# Patient Record
Sex: MALE | Race: WHITE | NOT HISPANIC OR LATINO | URBAN - METROPOLITAN AREA
[De-identification: names, ages, dates, MRNs, and addresses within clinical notes are randomized per-mention and may not be internally consistent; named-entity substitution may affect disease eponyms.]

---

## 2019-11-08 ENCOUNTER — INPATIENT (INPATIENT)
Facility: HOSPITAL | Age: 54
LOS: 2 days | Discharge: HOME | DRG: 558 | End: 2019-11-11
Attending: HOSPITALIST | Admitting: STUDENT IN AN ORGANIZED HEALTH CARE EDUCATION/TRAINING PROGRAM
Payer: COMMERCIAL

## 2019-11-08 VITALS
SYSTOLIC BLOOD PRESSURE: 136 MMHG | HEIGHT: 68 IN | TEMPERATURE: 100 F | HEART RATE: 110 BPM | RESPIRATION RATE: 17 BRPM | OXYGEN SATURATION: 100 % | DIASTOLIC BLOOD PRESSURE: 82 MMHG | WEIGHT: 169.98 LBS

## 2019-11-08 DIAGNOSIS — L02.91 CUTANEOUS ABSCESS, UNSPECIFIED: ICD-10-CM

## 2019-11-08 LAB
ALBUMIN SERPL ELPH-MCNC: 3.8 G/DL — SIGNIFICANT CHANGE UP (ref 3.3–5)
ALP SERPL-CCNC: 84 U/L — SIGNIFICANT CHANGE UP (ref 40–120)
ALT FLD-CCNC: 30 U/L — SIGNIFICANT CHANGE UP (ref 12–78)
ANION GAP SERPL CALC-SCNC: 8 MMOL/L — SIGNIFICANT CHANGE UP (ref 5–17)
APTT BLD: 33 SEC — SIGNIFICANT CHANGE UP (ref 28.5–37)
AST SERPL-CCNC: 21 U/L — SIGNIFICANT CHANGE UP (ref 15–37)
BASOPHILS # BLD AUTO: 0.07 K/UL — SIGNIFICANT CHANGE UP (ref 0–0.2)
BASOPHILS NFR BLD AUTO: 0.8 % — SIGNIFICANT CHANGE UP (ref 0–2)
BILIRUB SERPL-MCNC: 1 MG/DL — SIGNIFICANT CHANGE UP (ref 0.2–1.2)
BUN SERPL-MCNC: 16 MG/DL — SIGNIFICANT CHANGE UP (ref 7–23)
CALCIUM SERPL-MCNC: 8.8 MG/DL — SIGNIFICANT CHANGE UP (ref 8.5–10.1)
CHLORIDE SERPL-SCNC: 107 MMOL/L — SIGNIFICANT CHANGE UP (ref 96–108)
CO2 SERPL-SCNC: 25 MMOL/L — SIGNIFICANT CHANGE UP (ref 22–31)
CREAT SERPL-MCNC: 1.5 MG/DL — HIGH (ref 0.5–1.3)
EOSINOPHIL # BLD AUTO: 0.1 K/UL — SIGNIFICANT CHANGE UP (ref 0–0.5)
EOSINOPHIL NFR BLD AUTO: 1.1 % — SIGNIFICANT CHANGE UP (ref 0–6)
GLUCOSE SERPL-MCNC: 105 MG/DL — HIGH (ref 70–99)
HCT VFR BLD CALC: 36 % — LOW (ref 39–50)
HGB BLD-MCNC: 11.3 G/DL — LOW (ref 13–17)
IMM GRANULOCYTES NFR BLD AUTO: 0.6 % — SIGNIFICANT CHANGE UP (ref 0–1.5)
INR BLD: 1.15 RATIO — SIGNIFICANT CHANGE UP (ref 0.88–1.16)
LACTATE SERPL-SCNC: 1.6 MMOL/L — SIGNIFICANT CHANGE UP (ref 0.7–2)
LYMPHOCYTES # BLD AUTO: 1.22 K/UL — SIGNIFICANT CHANGE UP (ref 1–3.3)
LYMPHOCYTES # BLD AUTO: 13.4 % — SIGNIFICANT CHANGE UP (ref 13–44)
MCHC RBC-ENTMCNC: 19.7 PG — LOW (ref 27–34)
MCHC RBC-ENTMCNC: 31.4 GM/DL — LOW (ref 32–36)
MCV RBC AUTO: 62.7 FL — LOW (ref 80–100)
MONOCYTES # BLD AUTO: 1.06 K/UL — HIGH (ref 0–0.9)
MONOCYTES NFR BLD AUTO: 11.7 % — SIGNIFICANT CHANGE UP (ref 2–14)
NEUTROPHILS # BLD AUTO: 6.58 K/UL — SIGNIFICANT CHANGE UP (ref 1.8–7.4)
NEUTROPHILS NFR BLD AUTO: 72.4 % — SIGNIFICANT CHANGE UP (ref 43–77)
NRBC # BLD: 0 /100 WBCS — SIGNIFICANT CHANGE UP (ref 0–0)
PLATELET # BLD AUTO: 195 K/UL — SIGNIFICANT CHANGE UP (ref 150–400)
POTASSIUM SERPL-MCNC: 4 MMOL/L — SIGNIFICANT CHANGE UP (ref 3.5–5.3)
POTASSIUM SERPL-SCNC: 4 MMOL/L — SIGNIFICANT CHANGE UP (ref 3.5–5.3)
PROT SERPL-MCNC: 7.2 G/DL — SIGNIFICANT CHANGE UP (ref 6–8.3)
PROTHROM AB SERPL-ACNC: 13.1 SEC — HIGH (ref 10–12.9)
RBC # BLD: 5.74 M/UL — SIGNIFICANT CHANGE UP (ref 4.2–5.8)
RBC # FLD: 16.2 % — HIGH (ref 10.3–14.5)
SODIUM SERPL-SCNC: 140 MMOL/L — SIGNIFICANT CHANGE UP (ref 135–145)
WBC # BLD: 9.08 K/UL — SIGNIFICANT CHANGE UP (ref 3.8–10.5)
WBC # FLD AUTO: 9.08 K/UL — SIGNIFICANT CHANGE UP (ref 3.8–10.5)

## 2019-11-08 PROCEDURE — 99223 1ST HOSP IP/OBS HIGH 75: CPT | Mod: GC,AI

## 2019-11-08 PROCEDURE — 99284 EMERGENCY DEPT VISIT MOD MDM: CPT

## 2019-11-08 PROCEDURE — 73080 X-RAY EXAM OF ELBOW: CPT | Mod: 26,RT

## 2019-11-08 PROCEDURE — 93010 ELECTROCARDIOGRAM REPORT: CPT

## 2019-11-08 PROCEDURE — 71046 X-RAY EXAM CHEST 2 VIEWS: CPT | Mod: 26

## 2019-11-08 RX ORDER — SODIUM CHLORIDE 9 MG/ML
1000 INJECTION INTRAMUSCULAR; INTRAVENOUS; SUBCUTANEOUS ONCE
Refills: 0 | Status: COMPLETED | OUTPATIENT
Start: 2019-11-08 | End: 2019-11-08

## 2019-11-08 RX ORDER — ACETAMINOPHEN 500 MG
650 TABLET ORAL ONCE
Refills: 0 | Status: COMPLETED | OUTPATIENT
Start: 2019-11-08 | End: 2019-11-08

## 2019-11-08 RX ORDER — PIPERACILLIN AND TAZOBACTAM 4; .5 G/20ML; G/20ML
3.38 INJECTION, POWDER, LYOPHILIZED, FOR SOLUTION INTRAVENOUS ONCE
Refills: 0 | Status: COMPLETED | OUTPATIENT
Start: 2019-11-08 | End: 2019-11-08

## 2019-11-08 RX ORDER — VANCOMYCIN HCL 1 G
1000 VIAL (EA) INTRAVENOUS ONCE
Refills: 0 | Status: COMPLETED | OUTPATIENT
Start: 2019-11-08 | End: 2019-11-08

## 2019-11-08 RX ADMIN — Medication 650 MILLIGRAM(S): at 21:28

## 2019-11-08 RX ADMIN — SODIUM CHLORIDE 1000 MILLILITER(S): 9 INJECTION INTRAMUSCULAR; INTRAVENOUS; SUBCUTANEOUS at 20:58

## 2019-11-08 RX ADMIN — SODIUM CHLORIDE 1000 MILLILITER(S): 9 INJECTION INTRAMUSCULAR; INTRAVENOUS; SUBCUTANEOUS at 22:45

## 2019-11-08 RX ADMIN — Medication 650 MILLIGRAM(S): at 20:58

## 2019-11-08 RX ADMIN — SODIUM CHLORIDE 1000 MILLILITER(S): 9 INJECTION INTRAMUSCULAR; INTRAVENOUS; SUBCUTANEOUS at 23:48

## 2019-11-08 RX ADMIN — Medication 250 MILLIGRAM(S): at 23:38

## 2019-11-08 RX ADMIN — PIPERACILLIN AND TAZOBACTAM 200 GRAM(S): 4; .5 INJECTION, POWDER, LYOPHILIZED, FOR SOLUTION INTRAVENOUS at 22:52

## 2019-11-08 RX ADMIN — SODIUM CHLORIDE 1000 MILLILITER(S): 9 INJECTION INTRAMUSCULAR; INTRAVENOUS; SUBCUTANEOUS at 22:12

## 2019-11-08 RX ADMIN — PIPERACILLIN AND TAZOBACTAM 3.38 GRAM(S): 4; .5 INJECTION, POWDER, LYOPHILIZED, FOR SOLUTION INTRAVENOUS at 23:24

## 2019-11-08 NOTE — ED PROVIDER NOTE - ATTENDING CONTRIBUTION TO CARE
I have personally performed a face to face diagnostic evaluation on this patient.  I have reviewed the PA note and agree with the history, exam, and plan of care, except as noted.  History and Exam by me shows patient with right elbow cellulitis and abscess, patient states he has a history of psoriases, had redness of right elbow, went to derm, placed on cephalosporin antibiotic, f/u with ortho and had right elbow incision and drainage, packing placed, advised to go to ER if developed fever, today patient had 101F temp, and came to ER, right elbow swelling and erythema spreading to inner proximal arm, warm to touch, f/u labs, cultures, iv fluids, iv abx, admit.

## 2019-11-08 NOTE — ED ADULT NURSE NOTE - OBJECTIVE STATEMENT
Patient came in to ED c/o redness/ pain/ swelling of right elbow and fever this afternoon noted @ 100.7F. Patient states his right elbow was drained by Ortho yesterday and today swelling, redness and pain is persistent- swelling came up to right upper arm with fever that prompted him to come to ED. Patient already on oral antibiotics.

## 2019-11-08 NOTE — ED PROVIDER NOTE - OBJECTIVE STATEMENT
55 yo M PMHx psoriasis presents to ED c/o redness/swelling to RUE x last week. Pt states that symptoms began gradually after mild psoriatic exacerbation, admits to scratching affected area R elbow. First noticed redness and swelling 1 week ago- started getting progressively worse, went to derm 4 days ago- sent to ortho for evaluation infection. Derm PA placed pt on abx for f/u yesterday. Pt evaluated by Dr. Falk ([facility]) yesterday- I&D done, drain placed and started on Bactrim. Today, pt reports worsening redness, and swelling into upper arm and fever. T max 101.7 F just prior to arrival. Pt asymptomatic at this time.

## 2019-11-08 NOTE — ED ADULT NURSE NOTE - NSIMPLEMENTINTERV_GEN_ALL_ED
Implemented All Universal Safety Interventions:  [place] to call system. Call bell, personal items and telephone within reach. Instruct patient to call for assistance. Room bathroom lighting operational. Non-slip footwear when patient is off stretcher. Physically safe environment: no spills, clutter or unnecessary equipment. Stretcher in lowest position, wheels locked, appropriate side rails in place.

## 2019-11-09 DIAGNOSIS — L03.119 CELLULITIS OF UNSPECIFIED PART OF LIMB: ICD-10-CM

## 2019-11-09 DIAGNOSIS — N17.9 ACUTE KIDNEY FAILURE, UNSPECIFIED: ICD-10-CM

## 2019-11-09 DIAGNOSIS — Z29.9 ENCOUNTER FOR PROPHYLACTIC MEASURES, UNSPECIFIED: ICD-10-CM

## 2019-11-09 LAB
ALBUMIN SERPL ELPH-MCNC: 3.5 G/DL — SIGNIFICANT CHANGE UP (ref 3.3–5)
ALP SERPL-CCNC: 77 U/L — SIGNIFICANT CHANGE UP (ref 40–120)
ALT FLD-CCNC: 29 U/L — SIGNIFICANT CHANGE UP (ref 12–78)
ANION GAP SERPL CALC-SCNC: 6 MMOL/L — SIGNIFICANT CHANGE UP (ref 5–17)
ANION GAP SERPL CALC-SCNC: 6 MMOL/L — SIGNIFICANT CHANGE UP (ref 5–17)
AST SERPL-CCNC: 19 U/L — SIGNIFICANT CHANGE UP (ref 15–37)
BASOPHILS # BLD AUTO: 0.05 K/UL — SIGNIFICANT CHANGE UP (ref 0–0.2)
BASOPHILS # BLD AUTO: 0.07 K/UL — SIGNIFICANT CHANGE UP (ref 0–0.2)
BASOPHILS NFR BLD AUTO: 0.7 % — SIGNIFICANT CHANGE UP (ref 0–2)
BASOPHILS NFR BLD AUTO: 0.9 % — SIGNIFICANT CHANGE UP (ref 0–2)
BILIRUB SERPL-MCNC: 1.2 MG/DL — SIGNIFICANT CHANGE UP (ref 0.2–1.2)
BUN SERPL-MCNC: 14 MG/DL — SIGNIFICANT CHANGE UP (ref 7–23)
BUN SERPL-MCNC: 16 MG/DL — SIGNIFICANT CHANGE UP (ref 7–23)
CALCIUM SERPL-MCNC: 7.8 MG/DL — LOW (ref 8.5–10.1)
CALCIUM SERPL-MCNC: 8.3 MG/DL — LOW (ref 8.5–10.1)
CHLORIDE SERPL-SCNC: 110 MMOL/L — HIGH (ref 96–108)
CHLORIDE SERPL-SCNC: 110 MMOL/L — HIGH (ref 96–108)
CO2 SERPL-SCNC: 24 MMOL/L — SIGNIFICANT CHANGE UP (ref 22–31)
CO2 SERPL-SCNC: 26 MMOL/L — SIGNIFICANT CHANGE UP (ref 22–31)
CREAT SERPL-MCNC: 1.2 MG/DL — SIGNIFICANT CHANGE UP (ref 0.5–1.3)
CREAT SERPL-MCNC: 1.3 MG/DL — SIGNIFICANT CHANGE UP (ref 0.5–1.3)
CRP SERPL-MCNC: 5.5 MG/DL — HIGH (ref 0–0.4)
EOSINOPHIL # BLD AUTO: 0.11 K/UL — SIGNIFICANT CHANGE UP (ref 0–0.5)
EOSINOPHIL # BLD AUTO: 0.12 K/UL — SIGNIFICANT CHANGE UP (ref 0–0.5)
EOSINOPHIL NFR BLD AUTO: 1.5 % — SIGNIFICANT CHANGE UP (ref 0–6)
EOSINOPHIL NFR BLD AUTO: 1.7 % — SIGNIFICANT CHANGE UP (ref 0–6)
GLUCOSE SERPL-MCNC: 104 MG/DL — HIGH (ref 70–99)
GLUCOSE SERPL-MCNC: 154 MG/DL — HIGH (ref 70–99)
HCT VFR BLD CALC: 34 % — LOW (ref 39–50)
HCT VFR BLD CALC: 35.2 % — LOW (ref 39–50)
HGB BLD-MCNC: 10.6 G/DL — LOW (ref 13–17)
HGB BLD-MCNC: 10.9 G/DL — LOW (ref 13–17)
IMM GRANULOCYTES NFR BLD AUTO: 0.7 % — SIGNIFICANT CHANGE UP (ref 0–1.5)
IMM GRANULOCYTES NFR BLD AUTO: 0.8 % — SIGNIFICANT CHANGE UP (ref 0–1.5)
LYMPHOCYTES # BLD AUTO: 1.16 K/UL — SIGNIFICANT CHANGE UP (ref 1–3.3)
LYMPHOCYTES # BLD AUTO: 1.29 K/UL — SIGNIFICANT CHANGE UP (ref 1–3.3)
LYMPHOCYTES # BLD AUTO: 15.4 % — SIGNIFICANT CHANGE UP (ref 13–44)
LYMPHOCYTES # BLD AUTO: 18.6 % — SIGNIFICANT CHANGE UP (ref 13–44)
MCHC RBC-ENTMCNC: 19.7 PG — LOW (ref 27–34)
MCHC RBC-ENTMCNC: 19.9 PG — LOW (ref 27–34)
MCHC RBC-ENTMCNC: 31 GM/DL — LOW (ref 32–36)
MCHC RBC-ENTMCNC: 31.2 GM/DL — LOW (ref 32–36)
MCV RBC AUTO: 63.5 FL — LOW (ref 80–100)
MCV RBC AUTO: 63.7 FL — LOW (ref 80–100)
MONOCYTES # BLD AUTO: 0.64 K/UL — SIGNIFICANT CHANGE UP (ref 0–0.9)
MONOCYTES # BLD AUTO: 0.87 K/UL — SIGNIFICANT CHANGE UP (ref 0–0.9)
MONOCYTES NFR BLD AUTO: 12.5 % — SIGNIFICANT CHANGE UP (ref 2–14)
MONOCYTES NFR BLD AUTO: 8.5 % — SIGNIFICANT CHANGE UP (ref 2–14)
MRSA PCR RESULT.: SIGNIFICANT CHANGE UP
NEUTROPHILS # BLD AUTO: 4.57 K/UL — SIGNIFICANT CHANGE UP (ref 1.8–7.4)
NEUTROPHILS # BLD AUTO: 5.5 K/UL — SIGNIFICANT CHANGE UP (ref 1.8–7.4)
NEUTROPHILS NFR BLD AUTO: 65.8 % — SIGNIFICANT CHANGE UP (ref 43–77)
NEUTROPHILS NFR BLD AUTO: 72.9 % — SIGNIFICANT CHANGE UP (ref 43–77)
NRBC # BLD: 0 /100 WBCS — SIGNIFICANT CHANGE UP (ref 0–0)
NRBC # BLD: 0 /100 WBCS — SIGNIFICANT CHANGE UP (ref 0–0)
PLATELET # BLD AUTO: 171 K/UL — SIGNIFICANT CHANGE UP (ref 150–400)
PLATELET # BLD AUTO: 191 K/UL — SIGNIFICANT CHANGE UP (ref 150–400)
POTASSIUM SERPL-MCNC: 3.7 MMOL/L — SIGNIFICANT CHANGE UP (ref 3.5–5.3)
POTASSIUM SERPL-MCNC: 4.1 MMOL/L — SIGNIFICANT CHANGE UP (ref 3.5–5.3)
POTASSIUM SERPL-SCNC: 3.7 MMOL/L — SIGNIFICANT CHANGE UP (ref 3.5–5.3)
POTASSIUM SERPL-SCNC: 4.1 MMOL/L — SIGNIFICANT CHANGE UP (ref 3.5–5.3)
PROT SERPL-MCNC: 6.8 G/DL — SIGNIFICANT CHANGE UP (ref 6–8.3)
RBC # BLD: 5.34 M/UL — SIGNIFICANT CHANGE UP (ref 4.2–5.8)
RBC # BLD: 5.54 M/UL — SIGNIFICANT CHANGE UP (ref 4.2–5.8)
RBC # FLD: 15.9 % — HIGH (ref 10.3–14.5)
RBC # FLD: 16 % — HIGH (ref 10.3–14.5)
S AUREUS DNA NOSE QL NAA+PROBE: SIGNIFICANT CHANGE UP
SODIUM SERPL-SCNC: 140 MMOL/L — SIGNIFICANT CHANGE UP (ref 135–145)
SODIUM SERPL-SCNC: 142 MMOL/L — SIGNIFICANT CHANGE UP (ref 135–145)
WBC # BLD: 6.95 K/UL — SIGNIFICANT CHANGE UP (ref 3.8–10.5)
WBC # BLD: 7.54 K/UL — SIGNIFICANT CHANGE UP (ref 3.8–10.5)
WBC # FLD AUTO: 6.95 K/UL — SIGNIFICANT CHANGE UP (ref 3.8–10.5)
WBC # FLD AUTO: 7.54 K/UL — SIGNIFICANT CHANGE UP (ref 3.8–10.5)

## 2019-11-09 PROCEDURE — 73200 CT UPPER EXTREMITY W/O DYE: CPT | Mod: 26,RT

## 2019-11-09 PROCEDURE — 99233 SBSQ HOSP IP/OBS HIGH 50: CPT

## 2019-11-09 RX ORDER — ACETAMINOPHEN 500 MG
650 TABLET ORAL EVERY 6 HOURS
Refills: 0 | Status: DISCONTINUED | OUTPATIENT
Start: 2019-11-09 | End: 2019-11-11

## 2019-11-09 RX ORDER — VANCOMYCIN HCL 1 G
1000 VIAL (EA) INTRAVENOUS EVERY 12 HOURS
Refills: 0 | Status: DISCONTINUED | OUTPATIENT
Start: 2019-11-09 | End: 2019-11-10

## 2019-11-09 RX ORDER — SODIUM CHLORIDE 9 MG/ML
1000 INJECTION INTRAMUSCULAR; INTRAVENOUS; SUBCUTANEOUS
Refills: 0 | Status: DISCONTINUED | OUTPATIENT
Start: 2019-11-09 | End: 2019-11-10

## 2019-11-09 RX ORDER — INFLUENZA VIRUS VACCINE 15; 15; 15; 15 UG/.5ML; UG/.5ML; UG/.5ML; UG/.5ML
0.5 SUSPENSION INTRAMUSCULAR ONCE
Refills: 0 | Status: DISCONTINUED | OUTPATIENT
Start: 2019-11-09 | End: 2019-11-11

## 2019-11-09 RX ORDER — PIPERACILLIN AND TAZOBACTAM 4; .5 G/20ML; G/20ML
3.38 INJECTION, POWDER, LYOPHILIZED, FOR SOLUTION INTRAVENOUS EVERY 8 HOURS
Refills: 0 | Status: DISCONTINUED | OUTPATIENT
Start: 2019-11-09 | End: 2019-11-11

## 2019-11-09 RX ADMIN — PIPERACILLIN AND TAZOBACTAM 25 GRAM(S): 4; .5 INJECTION, POWDER, LYOPHILIZED, FOR SOLUTION INTRAVENOUS at 06:21

## 2019-11-09 RX ADMIN — Medication 650 MILLIGRAM(S): at 22:35

## 2019-11-09 RX ADMIN — Medication 250 MILLIGRAM(S): at 12:29

## 2019-11-09 RX ADMIN — Medication 650 MILLIGRAM(S): at 13:05

## 2019-11-09 RX ADMIN — Medication 650 MILLIGRAM(S): at 14:05

## 2019-11-09 RX ADMIN — Medication 250 MILLIGRAM(S): at 21:34

## 2019-11-09 RX ADMIN — PIPERACILLIN AND TAZOBACTAM 25 GRAM(S): 4; .5 INJECTION, POWDER, LYOPHILIZED, FOR SOLUTION INTRAVENOUS at 15:15

## 2019-11-09 RX ADMIN — Medication 650 MILLIGRAM(S): at 23:35

## 2019-11-09 RX ADMIN — PIPERACILLIN AND TAZOBACTAM 25 GRAM(S): 4; .5 INJECTION, POWDER, LYOPHILIZED, FOR SOLUTION INTRAVENOUS at 22:36

## 2019-11-09 RX ADMIN — SODIUM CHLORIDE 75 MILLILITER(S): 9 INJECTION INTRAMUSCULAR; INTRAVENOUS; SUBCUTANEOUS at 03:47

## 2019-11-09 NOTE — CONSULT NOTE ADULT - ASSESSMENT
TALIA, improved    Anemia with low MCV and high RBC#-- ?thalassemia > hereditary spherocytosis    Psoriasis- brandon wright Olecranon bursitis in the setting of psoriasis, secondary cellulitis  No immune suppressants  Almost certainly S. aureus, probably not MRSA given initial response to cefadroxil (unless polymicrobial which would be unusual).  Better with antibiotics and I&D  TALIA, improved  Anemia with low MCV and high RBC#-- patient confirms history of thalassemia trait    Suggestions--  Will attempt to obtain cx data from his orthopedist  Continue antibiotics  For now, target vanco trough 15-20  Hope to narrow Rx based on cx  Elevation  Serial exam    Thank you for the courtesy of this referral.  D/W patient and his wife    Lazarus Hopkins MD  [phone]

## 2019-11-09 NOTE — H&P ADULT - PROBLEM SELECTOR PLAN 1
r/o necrotizing fascitis given rapid progression of erythema and swelling  area marked in ED, monitor for signs of progression   will cover broadly with vancomycin and zosyn   f/u MRSA nares  f/u blood cultures  f/u CT RUE  Ortho paged, f/u recommendations   ID Dr. Hopkins consulted, recommendations appreciated r/o necrotizing fascitis given rapid progression of erythema and swelling  area marked in ED, monitor for signs of progression   will cover broadly with vancomycin and zosyn   f/u MRSA nares  f/u blood cultures  f/u CT RUE- unable to preform with contrast at this time as patient has TALIA and contrast injector not currently working.   Ortho paged, f/u recommendations   ID Dr. Hopkins consulted, recommendations appreciated r/o necrotizing fascitis , initial x ray with no air noted, given rapid progression of erythema and swelling obtain ct. r/o deep abscess  area marked in ED, monitor for signs of progression   failure of outpatient antibiotics   will cover broadly with vancomycin and zosyn   f/u MRSA nares  f/u blood cultures  f/u CT RUE- unable to preform with contrast at this time as patient has TALIA and contrast injector not currently working.   Ortho paged, f/u recommendations   ID Dr. Hopkins consulted, recommendations appreciated r/o necrotizing fascitis , initial x ray with no air noted, given rapid progression of erythema and swelling obtain ct. r/o deep abscess  pt non toxic appearing  area marked in ED, monitor for signs of progression   failure of outpatient antibiotics   will cover broadly with vancomycin and zosyn   f/u MRSA nares  f/u blood cultures  f/u CT RUE- unable to preform with contrast at this time as patient has TALIA and contrast injector not currently working.   Ortho paged, awaiting call back, f/u recommendations   ID Dr. Hopkins consulted, recommendations appreciated

## 2019-11-09 NOTE — H&P ADULT - PROBLEM SELECTOR PLAN 3
no chemical prophylaxis at this time pending ortho recs   scds  IMPROVE VTE Individual Risk Assessment          RISK                                                          Points    [  ] Previous VTE                                                3  [  ] Thrombophilia                                             2  [  ] Lower limb paralysis                                   2        (unable to hold up >15 seconds)    [  ] Current Cancer                                            2         (within 6 months)  [  ] Immobilization > 24 hrs                              1  [  ] ICU/CCU stay > 24 hours                            1  [  ] Age > 60                                                    1    IMPROVE VTE Score _____0____

## 2019-11-09 NOTE — CONSULT NOTE ADULT - SUBJECTIVE AND OBJECTIVE BOX
54yMale c/o R arm redness and fever after banging his elbow several days ago. Patient then presented to  ED and had his left elbow bursa I&D'd by Dr. Falk with a drain placed. He states his arm redness spread and presented to [facility] ED last night. Patient denies numbness or tingling in the RUE. Patient denies any other injuries.    PMH:  Psoriasis  No pertinent past medical history    PSH: Denies    AH: NKDA    Meds: See med rec    T(C): 37.3 (11-09-19 @ 04:15)  HR: 82 (11-09-19 @ 04:15)  BP: 125/74 (11-09-19 @ 04:15)  RR: 17 (11-09-19 @ 04:15)  SpO2: 97% (11-09-19 @ 04:15)  Wt(kg): --    PE RUE:  Skin intact, olecranon bursa packing in place, no drainage, no palpable fluid collection, erythema demarcated from wrist to proximal arm, SILT C5-T1, +AIN/PIN/Ulnar/Radial/Musc/Median, +shoulder/elbow/wrist ROM intact, +radial pulse, soft compartments, no calf ttp.    Secondary:  No TTP over bony landmarks, SILT BL, ROM intact BL, distal pulses palpable.    Imaging:  CT demonstrating no evidence of fluid collection, effusion, or subcutaneous emphysema    .hblock

## 2019-11-09 NOTE — H&P ADULT - HISTORY OF PRESENT ILLNESS
54 y male PMHx psoriasis presents with right upper extremity redness and swelling. Patient states that 14 years ago he fell and injured his right elbow, he developed bursitis a year later and had his elbow drained. He stated that he was fine up until 1 week ago when he banged his elbow injuring a psoriatic lesion that was present there. He then noticed some redness and swelling in his elbow which slowly worsened. He went to his dermatologist 4 days ago and was prescribed cefodroxil and sent to ortho. He was evaluated by Dr. Falk (ortho) yesterday and had an I&D done and a drain placed in his arm at the elbow. He was sent home on bactrim. Today the patient noted increase in redness and swelling traveling up his arm and down to his fingers. This was a fast progression of where his baseline erythema was present.  He also noted a t max of 101.7. Patient admitted to chills earlier this evening. Denies severe pain in RUE. Denies nausea, vomiting, abdominal pain.     In the ED  VS: T 99.7, , /82, RR 17, SpO2 100 on RA 54 y male PMHx psoriasis presents with right upper extremity redness and swelling. Patient states that 14 years ago he fell and injured his right elbow, he developed bursitis a year later and had his elbow drained. He stated that he was fine up until 1 week ago when he banged his elbow injuring a psoriatic lesion that was present there. He then noticed some redness and swelling in his elbow which slowly worsened. He went to his dermatologist 4 days ago and was prescribed cefodroxil and sent to ortho. He was evaluated by Dr. Falk (ortho) yesterday and had an I&D done and a drain placed in his arm at the elbow. He was sent home on bactrim. Today the patient noted increase in redness and swelling traveling up his arm and down to his fingers. This was a fast progression of where his baseline erythema was present.  He also noted a t max of 101.7. Patient admitted to chills earlier this evening. Denies severe pain in RUE. Denies nausea, vomiting, abdominal pain.     In the ED  VS: T 99.7, , /82, RR 17, SpO2 100 on RA  Labs: Cr. 1.50, H/H 11.3/36.0  x ray right upper extremity: No radiographic evidence of acute osteomyelitis. Soft tissue swelling   with drain, bandage or packing material over the olecranon.

## 2019-11-09 NOTE — PROGRESS NOTE ADULT - PROBLEM SELECTOR PLAN 1
r/o necrotizing fascitis , initial x ray with no air noted, given rapid progression of erythema and swelling obtain ct. r/o deep abscess  pt non toxic appearing  area marked in ED, monitor for signs of progression   failure of outpatient antibiotics   will cover broadly with vancomycin and zosyn   f/u MRSA nares  f/u blood cultures  f/u CT RUE- unable to preform with contrast at this time as patient has TALIA and contrast injector not currently working.   Ortho paged, awaiting call back, f/u recommendations   ID Dr. Hopkins consulted, recommendations appreciated  F/u on wound culture sent from his Dr Falk office.   keep Vancomycin through between 15-20 per ID recs.

## 2019-11-09 NOTE — CONSULT NOTE ADULT - ASSESSMENT
54M w/ RUE cellulitis s/p I&D olecranon bursa  Packing was dc'd from the bursa  Soft dressing applied, which may be removed in 2-3 days  Abx per ID/medicine  DVT ppx  WBAT RUE  PT/OT  Incentive spirometry  No further orthopedic intervention  Recommend f/u with Dr. Falk 5-7 days from discharge of hospital  Orthopedic stable for DC

## 2019-11-09 NOTE — H&P ADULT - PROBLEM SELECTOR PLAN 2
no chemical prophylaxis at this time pending ortho recs   scds  IMPROVE VTE Individual Risk Assessment          RISK                                                          Points    [  ] Previous VTE                                                3  [  ] Thrombophilia                                             2  [  ] Lower limb paralysis                                   2        (unable to hold up >15 seconds)    [  ] Current Cancer                                            2         (within 6 months)  [  ] Immobilization > 24 hrs                              1  [  ] ICU/CCU stay > 24 hours                            1  [  ] Age > 60                                                    1    IMPROVE VTE Score _____0____ Cr 1.50 on admission, unknown baseline  maintenance IVF   monitor AM BMP

## 2019-11-09 NOTE — H&P ADULT - ASSESSMENT
54 y male PMHx psoriasis presents with right upper extremity redness and swelling. Admitted with RUE cellulitis.

## 2019-11-09 NOTE — H&P ADULT - NSHPPHYSICALEXAM_GEN_ALL_CORE
T(C): 36.9 (11-08-19 @ 22:56), Max: 37.6 (11-08-19 @ 20:03)  HR: 88 (11-08-19 @ 22:56) (88 - 110)  BP: 119/73 (11-08-19 @ 22:56) (119/73 - 136/82)  RR: 16 (11-08-19 @ 22:56) (16 - 17)  SpO2: 98% (11-08-19 @ 22:56) (98% - 100%)    GENERAL: patient appears well, no acute distress, appropriate, pleasant  EYES: sclera clear, no exudates  ENMT: oropharynx clear without erythema, no exudates, moist mucous membranes  NECK: supple, soft, no thyromegaly noted  LUNGS: good air entry bilaterally, clear to auscultation, symmetric breath sounds, no wheezing or rhonchi appreciated  HEART: soft S1/S2, regular rate and rhythm, no murmurs noted, no lower extremity edema  GASTROINTESTINAL: abdomen is soft, nontender, nondistended, normoactive bowel sounds, no palpable masses  INTEGUMENT: erythema noted from fingers up to axillae on RUE, drain in right elbow, no drainage noted. + edema noted from fingers to axillae.   MUSCULOSKELETAL: + edema noted from fingers to axillae of RUE.   NEUROLOGIC: awake, alert, oriented x3, good muscle tone in 4 extremities, no obvious sensory deficits  PSYCHIATRIC: mood is good, affect is congruent, linear and logical thought process T(C): 36.9 (11-08-19 @ 22:56), Max: 37.6 (11-08-19 @ 20:03)  HR: 88 (11-08-19 @ 22:56) (88 - 110)  BP: 119/73 (11-08-19 @ 22:56) (119/73 - 136/82)  RR: 16 (11-08-19 @ 22:56) (16 - 17)  SpO2: 98% (11-08-19 @ 22:56) (98% - 100%)    GENERAL: patient appears well, no acute distress, appropriate, pleasant  EYES: sclera clear, no exudates  ENMT: oropharynx clear without erythema, no exudates, moist mucous membranes  NECK: supple, soft, no thyromegaly noted  LUNGS: good air entry bilaterally, clear to auscultation, symmetric breath sounds, no wheezing or rhonchi appreciated  HEART: soft S1/S2, regular rate and rhythm, no murmurs noted, no lower extremity edema  GASTROINTESTINAL: abdomen is soft, nontender, nondistended, normoactive bowel sounds, no palpable masses  INTEGUMENT: erythema noted from fingers up to axillae on RUE, packing in right elbow, no drainage noted. + extensive edema noted from fingers to axillae. fingers with good cap refill, good radial pulses.   MUSCULOSKELETAL: + edema noted from fingers to axillae of RUE.   NEUROLOGIC: awake, alert, oriented x3, good muscle tone in 4 extremities, no obvious sensory deficits  PSYCHIATRIC: mood is good, affect is congruent, linear and logical thought process

## 2019-11-09 NOTE — PROGRESS NOTE ADULT - SUBJECTIVE AND OBJECTIVE BOX
Patient is a 54y old  Male who presents with a chief complaint of cellulitis (09 Nov 2019 13:52)      INTERVAL HPI/OVERNIGHT EVENTS: 54 y male PMHx psoriasis presents with right upper extremity redness and swelling. Admitted with RUE cellulitis. pt feels better, no fever no wound drainage.     MEDICATIONS  (STANDING):  influenza   Vaccine 0.5 milliLiter(s) IntraMuscular once  piperacillin/tazobactam IVPB.. 3.375 Gram(s) IV Intermittent every 8 hours  sodium chloride 0.9%. 1000 milliLiter(s) (75 mL/Hr) IV Continuous <Continuous>  vancomycin  IVPB 1000 milliGRAM(s) IV Intermittent every 12 hours    MEDICATIONS  (PRN):  acetaminophen   Tablet .. 650 milliGRAM(s) Oral every 6 hours PRN Mild Pain (1 - 3)      Allergies    No Known Allergies    Intolerances        REVIEW OF SYSTEMS:  CONSTITUTIONAL: No fever, weight loss, or fatigue  EYES: No eye pain, visual disturbances, or discharge  ENMT:  No difficulty hearing, tinnitus, vertigo; No sinus or throat pain  NECK: No pain or stiffness  BREASTS: No pain, masses, or nipple discharge  RESPIRATORY: No cough, wheezing, chills or hemoptysis; No shortness of breath  CARDIOVASCULAR: No chest pain, palpitations, dizziness, or leg swelling  GASTROINTESTINAL: No abdominal or epigastric pain. No nausea, vomiting, or hematemesis; No diarrhea or constipation. No melena or hematochezia.  GENITOURINARY: No dysuria, frequency, hematuria, or incontinence  NEUROLOGICAL: No headaches, memory loss, loss of strength, numbness, or tremors  SKIN: No itching, burning, rashes, or lesions   LYMPH NODES: No enlarged glands  ENDOCRINE: No heat or cold intolerance; No hair loss; No polydipsia or polyuria  MUSCULOSKELETAL: No joint pain or swelling; No muscle, back, or extremity pain  PSYCHIATRIC: No depression, anxiety, mood swings, or difficulty sleeping  HEME/LYMPH: No easy bruising, or bleeding gums  ALLERGY AND IMMUNOLOGIC: No hives or eczema    Vital Signs Last 24 Hrs  T(C): 37.2 (09 Nov 2019 13:24), Max: 37.6 (08 Nov 2019 20:03)  T(F): 99 (09 Nov 2019 13:24), Max: 99.7 (08 Nov 2019 20:03)  HR: 86 (09 Nov 2019 13:24) (82 - 110)  BP: 115/66 (09 Nov 2019 13:24) (115/66 - 136/82)  BP(mean): --  RR: 17 (09 Nov 2019 13:24) (16 - 17)  SpO2: 97% (09 Nov 2019 13:24) (96% - 100%)    PHYSICAL EXAM:  GENERAL: NAD, well-groomed, well-developed  HEAD:  Atraumatic, Normocephalic  EYES: EOMI, PERRLA, conjunctiva and sclera clear  ENMT: No tonsillar erythema, exudates, or enlargement; Moist mucous membranes, Good dentition, No lesions  NECK: Supple, No JVD, Normal thyroid  NERVOUS SYSTEM:  Alert & Oriented X3, Good concentration; Motor Strength 5/5 B/L upper and lower extremities; DTRs 2+ intact and symmetric  CHEST/LUNG: Clear to auscultation bilaterally; No rales, rhonchi, wheezing, or rubs  HEART: Regular rate and rhythm; No murmurs, rubs, or gallops  ABDOMEN: Soft, Nontender, Nondistended; Bowel sounds present  EXTREMITIES:  2+ Peripheral Pulses, No clubbing,  right arm edema.   LYMPH: No lymphadenopathy noted  SKIN: No rashes or lesions    LABS:                        10.9   7.54  )-----------( 191      ( 09 Nov 2019 09:01 )             35.2     09 Nov 2019 09:01    140    |  110    |  14     ----------------------------<  154    3.7     |  24     |  1.30     Ca    8.3        09 Nov 2019 09:01    TPro  6.8    /  Alb  3.5    /  TBili  1.2    /  DBili  x      /  AST  19     /  ALT  29     /  AlkPhos  77     09 Nov 2019 09:01    PT/INR - ( 08 Nov 2019 20:59 )   PT: 13.1 sec;   INR: 1.15 ratio         PTT - ( 08 Nov 2019 20:59 )  PTT:33.0 sec    CAPILLARY BLOOD GLUCOSE          RADIOLOGY & ADDITIONAL TESTS:    Imaging Personally Reviewed:  [x ] YES  [ ] NO    Consultant(s) Notes Reviewed:  [x ] YES  [ ] NO    Care Discussed with Consultants/Other Providers [x ] YES  [ ] NO

## 2019-11-09 NOTE — CONSULT NOTE ADULT - SUBJECTIVE AND OBJECTIVE BOX
[facility], Division of Infectious Diseases  JANE Melton A. Lee    SHAYAN, PETER  54y, Male  [id number]    HPI--  54M with psoriasis developed infection on elbow not repsonsive to PO antibiotics prescribed by his dermatologist, sent to orthopedic surgeon who performed an I&D of what sounds like olecranon bursitis. However patient developed increasing redness and fever depsite PO Bactrim and presented here for further treatment. Patient has been seen by orthopedics, wound unpacked by them. X-rays unremarkable for bony pathology and no surgical intervention indicated at this time. Patient had a CT of the arm, without contrast. There was no air or changes concerning for fasciitis, nor abscess (though study was noncontrast.     PMH/PSH--  Psoriasis  No pertinent past medical history      Allergies--NKDA      Medications--  Antibiotics: piperacillin/tazobactam IVPB.. 3.375 Gram(s) IV Intermittent every 8 hours  vancomycin  IVPB 1000 milliGRAM(s) IV Intermittent every 12 hours    Immunologic: influenza   Vaccine 0.5 milliLiter(s) IntraMuscular once    Other: acetaminophen   Tablet .. PRN  sodium chloride 0.9%.      Social History--  EtOH: denies   Tobacco: denies   Drug Use: denies     Family/Marital History--    Remainder not relevant to clinical concern.    Travel/Environmental/Occupational History:  *** insert T/E/O Hx ***    Review of Systems:  A >=10-point review of systems was obtained.     Pertinent positives and negatives--  Constitutional: No fevers. No Chills. No Rigors.   Eyes:  ENMT:  Cardiovascular: No chest pain. No palpitations.  Respiratory: No shortness of breath. No cough.  Gastrointestinal: No nausea or vomiting. No diarrhea or constipation.   Genitourinary:  Musculoskeletal:  Skin:  Neurologic:  Psychiatric: Pleasant. Appropriate affect.  Endocrine:  Heme/Lymphatic:  Allergy/Immunologic:    Review of systems otherwise negative except as previously noted.    Physical Exam--  Vital Signs: T(F): 99 (11-09-19 @ 13:24), Max: 99.7 (11-08-19 @ 20:03)  HR: 86 (11-09-19 @ 13:24)  BP: 115/66 (11-09-19 @ 13:24)  RR: 17 (11-09-19 @ 13:24)  SpO2: 97% (11-09-19 @ 13:24)  Wt(kg): --  General: Nontoxic-appearing Male in no acute distress.  HEENT: AT/NC. PERRL. EOMI. Anicteric. Conjunctiva pink and moist. Oropharynx clear. Dentition fair.  Neck: Not rigid. No sense of mass.  Nodes: None palpable.  Lungs: Clear bilaterally without rales, wheezing or rhonchi  Heart: Regular rate and rhythm. No Murmur. No rub. No gallop. No palpable thrill.  Abdomen: Bowel sounds present and normoactive. Soft. Nondistended. Nontender. No sense of mass. No organomegaly.  Back: No spinal tenderness. No costovertebral angle tenderness.   Extremities: No cyanosis or clubbing. No edema.   Skin: Warm. Dry. Good turgor. No rash. No vasculitic stigmata.  Psychiatric: Appropriate affect and mood for situation.         Laboratory & Imaging Data--  CBC                        10.9   7.54  )-----------( 191      ( 09 Nov 2019 09:01 )             35.2       Chemistries  11-09    140  |  110<H>  |  14  ----------------------------<  154<H>  3.7   |  24  |  1.30    Ca    8.3<L>      09 Nov 2019 09:01    TPro  6.8  /  Alb  3.5  /  TBili  1.2  /  DBili  x   /  AST  19  /  ALT  29  /  AlkPhos  77  11-09      Culture Data    < from: Xray Elbow AP + Lateral + Oblique, Right (11.08.19 @ 20:53) >  EXAM:  ELBOW RIGHT (3 VIEWS)                        PROCEDURE DATE:  11/08/2019    INTERPRETATION:  CLINICAL HISTORY: 54 years  Male with abscess.  COMPARISON: None  AP, lateral and oblique radiographs of the right elbow were obtained.    There is packing material versus bandage or drain in the soft tissues   overlying the olecranon with soft tissue thickening. No soft tissue air   is identified.    There is no fracture, subluxation or dislocation. No joint effusion is   present.No lytic or blastic lesions are identified. There is a small   olecranon spur.    There is no periosteal reaction or cortical disruption to suggest   osteomyelitis.    The osseous mineralization is normal.    No soft tissue abnormality is seen.    IMPRESSION:  No radiographic evidence of acute osteomyelitis. Soft tissue swelling   with drain, bandage or packing material over the olecranon.    TENISHA CRISTOBAL M.D., ATTENDING RADIOLOGIST  This document has been electronically signed. Nov 8 2019  8:59PM  < end of copied text >      < from: CT Upper Extremity No Cont, Right (11.09.19 @ 02:40) >  INTERPRETATION:      PROCEDURE INFORMATION:   Exam: CT Right Upper Extremity Without Contrast   Exam date and time: 11/9/2019 1:39 AM   Clinical history: 54 years old, male; Condition or disease; Upper limb;   Right;   Patient HX: Rue swelling, erythema R/O nec fac, deep abscess     TECHNIQUE:   Imaging protocol: CT of the Right upper extremity without intravenous   contrast was performed.     COMPARISON:   DX XR ELBOW 3 VIEWS RIGHT 11/8/2019 8:50 PM     FINDINGS:   Bones/joints: Laceration involving the proximal forearm/elbow dorsally   with packing material in place. Osseous structures intact. No joint   effusion.   Soft tissues: Moderate subcutaneous edematous  and skin thickening noted   greatest involving the right forearm. No abscess collection. No   subcutaneous air. However, the lack of intravenous contrast material   limits assessment for loculated fluid collections.  IMPRESSION:   1. Right forearm cellulitis with laceration with packing material along   the dorsal aspect of proximal forearm/elbow. No abscess collection. No   subcutaneous air to suggest necrotizing fasciitis.   2. No joint effusion. Osseous structures intact.  3. The lackof intravenous contrast material limits assessment for   loculated fluid collections.  A preliminary report was given by [facility] RADIOLOGY: BRIAN BECK M.D  < end of copied text > [facility], Division of Infectious Diseases  JANE Melton A. Lee    SHAYAN, PETER  54y, Male  [id number]    HPI--  54M with psoriasis diagnosed about 6 months ago, on treatment for about 4 weeks with topical steroids, developed infection on elbow not repsonsive to PO antibiotics, He initially was given cefadroxil with improvement, but progress plateaued and he was sent to Dr. Hill, an orthopedic surgeon, who performed an I&D of what sounds like olecranon bursitis. Cultures were sent at the time of the I&D, results not known. However patient developed increasing redness and fever despite PO Bactrim and presented here for further treatment. Patient has been seen by orthopedics, wound unpacked by them. X-rays unremarkable for bony pathology and no surgical intervention indicated at this time. Patient had a CT of the arm, without contrast. There was no air or changes concerning for fasciitis, nor abscess (though study was noncontrast.     Patient had trauma about 14 years ago to the same elbow and developed olecranon bursitis there abotu 6 months afterward. Patient has not had any problems since with that elbow. Denies any history of osteomyelitis, patient was treated with only a brief course of oral antibiotics at this time (though did require I&D then, too).     Does not recall any recent trauma to the elbow. No other similar episodes. He has only received topical Rx for the posirasis, no systemic steroids or immunosuppressive agents.    He states that although edema had increased, today is better than yesterday. The pain and redness are also less.      PMH/PSH--  Psoriasis  No pertinent past medical history      Allergies--NKDA      Medications--  Antibiotics: piperacillin/tazobactam IVPB.. 3.375 Gram(s) IV Intermittent every 8 hours  vancomycin  IVPB 1000 milliGRAM(s) IV Intermittent every 12 hours    Immunologic: influenza   Vaccine 0.5 milliLiter(s) IntraMuscular once    Other: acetaminophen   Tablet .. PRN  sodium chloride 0.9%.      Social History--  EtOH: denies   Tobacco: denies   Drug Use: denies     Family/Marital History--  .  Remainder not relevant to clinical concern.    Travel/Environmental/Occupational History:  , currently working on [employer]    Review of Systems:  A >=10-point review of systems was obtained.   Review of systems otherwise negative except as previously noted.    Physical Exam--  Vital Signs: T(F): 99 (11-09-19 @ 13:24), Max: 99.7 (11-08-19 @ 20:03)  HR: 86 (11-09-19 @ 13:24)  BP: 115/66 (11-09-19 @ 13:24)  RR: 17 (11-09-19 @ 13:24)  SpO2: 97% (11-09-19 @ 13:24)  Wt(kg): --  General: Nontoxic-appearing Male in no acute distress.  HEENT: AT/NC. Anicteric. Conjunctiva pink and moist. Oropharynx clear.   Neck: Not rigid. No sense of mass.  Nodes: None palpable.  Lungs: Clear bilaterally without rales, wheezing or rhonchi  Heart: Regular rate and rhythm. No Murmur. No rub. No gallop. No palpable thrill.  Abdomen: Bowel sounds present and normoactive. Soft. Nondistended. Nontender. No sense of mass. No organomegaly.  Back: No spinal tenderness. No costovertebral angle tenderness.   Extremities: No cyanosis or clubbing. I&D site clean/dry. Not packed. No DC expressible. Mild calor/erythema. 2+ edema mid upper arm to fingers. No bullae,anesthesia or concern of deep infection. ROM elbow full.   Skin: Warm. Dry. Good turgor. No rash. No vasculitic stigmata. Psoriaform plaques on L elbow and L prepatellar area as well  Psychiatric: Appropriate affect and mood for situation.       Laboratory & Imaging Data--  CBC                        10.9   7.54  )-----------( 191      ( 09 Nov 2019 09:01 )             35.2       Chemistries  11-09    140  |  110<H>  |  14  ----------------------------<  154<H>  3.7   |  24  |  1.30    Ca    8.3<L>      09 Nov 2019 09:01    TPro  6.8  /  Alb  3.5  /  TBili  1.2  /  DBili  x   /  AST  19  /  ALT  29  /  AlkPhos  77  11-09      Culture Data  None    < from: Xray Elbow AP + Lateral + Oblique, Right (11.08.19 @ 20:53) >  EXAM:  ELBOW RIGHT (3 VIEWS)                        PROCEDURE DATE:  11/08/2019    INTERPRETATION:  CLINICAL HISTORY: 54 years  Male with abscess.  COMPARISON: None  AP, lateral and oblique radiographs of the right elbow were obtained.    There is packing material versus bandage or drain in the soft tissues   overlying the olecranon with soft tissue thickening. No soft tissue air   is identified.    There is no fracture, subluxation or dislocation. No joint effusion is   present.No lytic or blastic lesions are identified. There is a small   olecranon spur.    There is no periosteal reaction or cortical disruption to suggest   osteomyelitis.    The osseous mineralization is normal.    No soft tissue abnormality is seen.    IMPRESSION:  No radiographic evidence of acute osteomyelitis. Soft tissue swelling   with drain, bandage or packing material over the olecranon.    TENISHA CRISTOBAL M.D., ATTENDING RADIOLOGIST  This document has been electronically signed. Nov 8 2019  8:59PM  < end of copied text >      < from: CT Upper Extremity No Cont, Right (11.09.19 @ 02:40) >  INTERPRETATION:      PROCEDURE INFORMATION:   Exam: CT Right Upper Extremity Without Contrast   Exam date and time: 11/9/2019 1:39 AM   Clinical history: 54 years old, male; Condition or disease; Upper limb;   Right;   Patient HX: Rue swelling, erythema R/O nec fac, deep abscess     TECHNIQUE:   Imaging protocol: CT of the Right upper extremity without intravenous   contrast was performed.     COMPARISON:   DX XR ELBOW 3 VIEWS RIGHT 11/8/2019 8:50 PM     FINDINGS:   Bones/joints: Laceration involving the proximal forearm/elbow dorsally   with packing material in place. Osseous structures intact. No joint   effusion.   Soft tissues: Moderate subcutaneous edematous  and skin thickening noted   greatest involving the right forearm. No abscess collection. No   subcutaneous air. However, the lack of intravenous contrast material   limits assessment for loculated fluid collections.  IMPRESSION:   1. Right forearm cellulitis with laceration with packing material along   the dorsal aspect of proximal forearm/elbow. No abscess collection. No   subcutaneous air to suggest necrotizing fasciitis.   2. No joint effusion. Osseous structures intact.  3. The lackof intravenous contrast material limits assessment for   loculated fluid collections.  A preliminary report was given by [facility] RADIOLOGY: BRIAN BECK M.D  < end of copied text >

## 2019-11-09 NOTE — H&P ADULT - NSHPREVIEWOFSYSTEMS_GEN_ALL_CORE
CONSTITUTIONAL: admits to fevers, chills. Denies weakness   HEENT: denies blurred vision, sore throat  SKIN: swelling and erythema or RUE, drain in right elbow   CARDIOVASCULAR: denies chest pain, chest pressure, palpitations  RESPIRATORY: denies shortness of breath, sputum production  GASTROINTESTINAL: denies nausea, vomiting, diarrhea, abdominal pain  GENITOURINARY: denies dysuria, discharge  NEUROLOGICAL: denies numbness, headache, focal weakness  MUSCULOSKELETAL: denies new joint pain, muscle aches  HEMATOLOGIC: denies gross bleeding, bruising  LYMPHATICS: denies enlarged lymph nodes

## 2019-11-10 LAB
ANION GAP SERPL CALC-SCNC: 6 MMOL/L — SIGNIFICANT CHANGE UP (ref 5–17)
BUN SERPL-MCNC: 13 MG/DL — SIGNIFICANT CHANGE UP (ref 7–23)
CALCIUM SERPL-MCNC: 9 MG/DL — SIGNIFICANT CHANGE UP (ref 8.5–10.1)
CHLORIDE SERPL-SCNC: 111 MMOL/L — HIGH (ref 96–108)
CO2 SERPL-SCNC: 25 MMOL/L — SIGNIFICANT CHANGE UP (ref 22–31)
CREAT SERPL-MCNC: 1.1 MG/DL — SIGNIFICANT CHANGE UP (ref 0.5–1.3)
GLUCOSE SERPL-MCNC: 80 MG/DL — SIGNIFICANT CHANGE UP (ref 70–99)
HCT VFR BLD CALC: 37.6 % — LOW (ref 39–50)
HCV AB S/CO SERPL IA: 0.14 S/CO — SIGNIFICANT CHANGE UP (ref 0–0.99)
HCV AB SERPL-IMP: SIGNIFICANT CHANGE UP
HGB BLD-MCNC: 11.6 G/DL — LOW (ref 13–17)
MCHC RBC-ENTMCNC: 20 PG — LOW (ref 27–34)
MCHC RBC-ENTMCNC: 30.9 GM/DL — LOW (ref 32–36)
MCV RBC AUTO: 64.9 FL — LOW (ref 80–100)
NRBC # BLD: 0 /100 WBCS — SIGNIFICANT CHANGE UP (ref 0–0)
PLATELET # BLD AUTO: 204 K/UL — SIGNIFICANT CHANGE UP (ref 150–400)
POTASSIUM SERPL-MCNC: 4.1 MMOL/L — SIGNIFICANT CHANGE UP (ref 3.5–5.3)
POTASSIUM SERPL-SCNC: 4.1 MMOL/L — SIGNIFICANT CHANGE UP (ref 3.5–5.3)
RBC # BLD: 5.79 M/UL — SIGNIFICANT CHANGE UP (ref 4.2–5.8)
RBC # FLD: 17.1 % — HIGH (ref 10.3–14.5)
SODIUM SERPL-SCNC: 142 MMOL/L — SIGNIFICANT CHANGE UP (ref 135–145)
VANCOMYCIN TROUGH SERPL-MCNC: 8.9 UG/ML — LOW (ref 10–20)
WBC # BLD: 6.61 K/UL — SIGNIFICANT CHANGE UP (ref 3.8–10.5)
WBC # FLD AUTO: 6.61 K/UL — SIGNIFICANT CHANGE UP (ref 3.8–10.5)

## 2019-11-10 PROCEDURE — 99232 SBSQ HOSP IP/OBS MODERATE 35: CPT

## 2019-11-10 RX ORDER — VANCOMYCIN HCL 1 G
1250 VIAL (EA) INTRAVENOUS EVERY 12 HOURS
Refills: 0 | Status: DISCONTINUED | OUTPATIENT
Start: 2019-11-10 | End: 2019-11-11

## 2019-11-10 RX ADMIN — PIPERACILLIN AND TAZOBACTAM 25 GRAM(S): 4; .5 INJECTION, POWDER, LYOPHILIZED, FOR SOLUTION INTRAVENOUS at 06:23

## 2019-11-10 RX ADMIN — Medication 166.67 MILLIGRAM(S): at 21:26

## 2019-11-10 RX ADMIN — PIPERACILLIN AND TAZOBACTAM 25 GRAM(S): 4; .5 INJECTION, POWDER, LYOPHILIZED, FOR SOLUTION INTRAVENOUS at 21:27

## 2019-11-10 RX ADMIN — PIPERACILLIN AND TAZOBACTAM 25 GRAM(S): 4; .5 INJECTION, POWDER, LYOPHILIZED, FOR SOLUTION INTRAVENOUS at 14:52

## 2019-11-10 RX ADMIN — Medication 250 MILLIGRAM(S): at 11:28

## 2019-11-10 NOTE — PROGRESS NOTE ADULT - PROBLEM SELECTOR PLAN 1
r/o necrotizing fascitis , initial x ray with no air noted, given rapid progression of erythema and swelling obtain ct. r/o deep abscess  pt non toxic appearing  area marked in ED, monitor for signs of progression   failure of outpatient antibiotics   will cover broadly with vancomycin and zosyn   f/u MRSA nares  f/u blood cultures  f/u CT RUE- unable to preform with contrast at this time as patient has TALIA and contrast injector not currently working.   Ortho paged, awaiting call back, f/u recommendations   ID Dr. Hopkins consulted, recommendations appreciated  F/u on wound culture sent from his Dr Falk office.   keep Vancomycin through between 15-20 per ID recs.  d/c at am on po Doxy.  I spoke to Dr Falk about culture result,  still pending .

## 2019-11-10 NOTE — PROGRESS NOTE ADULT - SUBJECTIVE AND OBJECTIVE BOX
Patient is a 54y old  Male who presents with a chief complaint of cellulitis (09 Nov 2019 15:10)      INTERVAL HPI/OVERNIGHT EVENTS: 54 y male PMHx psoriasis presents with right upper extremity redness and swelling. Admitted with RUE cellulitis. pt feels better,    MEDICATIONS  (STANDING):  influenza   Vaccine 0.5 milliLiter(s) IntraMuscular once  piperacillin/tazobactam IVPB.. 3.375 Gram(s) IV Intermittent every 8 hours  vancomycin  IVPB 1250 milliGRAM(s) IV Intermittent every 12 hours    MEDICATIONS  (PRN):  acetaminophen   Tablet .. 650 milliGRAM(s) Oral every 6 hours PRN Mild Pain (1 - 3)      Allergies    No Known Allergies    Intolerances        REVIEW OF SYSTEMS:  CONSTITUTIONAL: No fever, weight loss, or fatigue  EYES: No eye pain, visual disturbances, or discharge  ENMT:  No difficulty hearing, tinnitus, vertigo; No sinus or throat pain  NECK: No pain or stiffness  BREASTS: No pain, masses, or nipple discharge  RESPIRATORY: No cough, wheezing, chills or hemoptysis; No shortness of breath  CARDIOVASCULAR: No chest pain, palpitations, dizziness, or leg swelling  GASTROINTESTINAL: No abdominal or epigastric pain. No nausea, vomiting, or hematemesis; No diarrhea or constipation. No melena or hematochezia.  GENITOURINARY: No dysuria, frequency, hematuria, or incontinence  NEUROLOGICAL: No headaches, memory loss, loss of strength, numbness, or tremors  SKIN: No itching, burning, rashes, or lesions   LYMPH NODES: No enlarged glands  ENDOCRINE: No heat or cold intolerance; No hair loss; No polydipsia or polyuria  MUSCULOSKELETAL: No joint pain or swelling; No muscle, back, or extremity pain  PSYCHIATRIC: No depression, anxiety, mood swings, or difficulty sleeping  HEME/LYMPH: No easy bruising, or bleeding gums  ALLERGY AND IMMUNOLOGIC: No hives or eczema    Vital Signs Last 24 Hrs  T(C): 36.7 (10 Nov 2019 05:24), Max: 37.2 (09 Nov 2019 13:24)  T(F): 98 (10 Nov 2019 05:24), Max: 99 (09 Nov 2019 13:24)  HR: 68 (10 Nov 2019 05:24) (68 - 90)  BP: 124/80 (10 Nov 2019 05:24) (115/66 - 142/87)  BP(mean): --  RR: 17 (10 Nov 2019 05:24) (17 - 17)  SpO2: 98% (10 Nov 2019 05:24) (97% - 98%)    PHYSICAL EXAM:  GENERAL: NAD, well-groomed, well-developed  HEAD:  Atraumatic, Normocephalic  EYES: EOMI, PERRLA, conjunctiva and sclera clear  ENMT: No tonsillar erythema, exudates, or enlargement; Moist mucous membranes, Good dentition, No lesions  NECK: Supple, No JVD, Normal thyroid  NERVOUS SYSTEM:  Alert & Oriented X3, Good concentration; Motor Strength 5/5 B/L upper and lower extremities; DTRs 2+ intact and symmetric  CHEST/LUNG: Clear to auscultation bilaterally; No rales, rhonchi, wheezing, or rubs  HEART: Regular rate and rhythm; No murmurs, rubs, or gallops  ABDOMEN: Soft, Nontender, Nondistended; Bowel sounds present  EXTREMITIES:  2+ Peripheral Pulses, No clubbing, cyanosis, or edema  LYMPH: No lymphadenopathy noted  SKIN: decreased erythema,     LABS:                        11.6   6.61  )-----------( 204      ( 10 Nov 2019 10:20 )             37.6     10 Nov 2019 10:20    142    |  111    |  13     ----------------------------<  80     4.1     |  25     |  1.10     Ca    9.0        10 Nov 2019 10:20      PT/INR - ( 08 Nov 2019 20:59 )   PT: 13.1 sec;   INR: 1.15 ratio         PTT - ( 08 Nov 2019 20:59 )  PTT:33.0 sec    CAPILLARY BLOOD GLUCOSE      POCT Blood Glucose.: 123 mg/dL (09 Nov 2019 21:04)      RADIOLOGY & ADDITIONAL TESTS:    Imaging Personally Reviewed:  [ x] YES  [ ] NO    Consultant(s) Notes Reviewed:  [x ] YES  [ ] NO    Care Discussed with Consultants/Other Providers [x ] YES  [ ] NO

## 2019-11-10 NOTE — PROGRESS NOTE ADULT - ASSESSMENT
Olecranon bursitis in the setting of psoriasis, secondary cellulitis  No immune suppressants  Almost certainly S. aureus, probably not MRSA given initial response to cefadroxil (unless polymicrobial which would be unusual).  Better with antibiotics and I&D  TALIA, improved  Anemia with low MCV and high RBC#-- patient confirms history of thalassemia trait    11/10- doing well    Suggestions--  Continue antibiotics  Will adjust vanco dose, though again improvement with subtherapeutic trough speaks against MRSA here  OPD cx data not available.  Elevation  Serial exams  If continues to make progress no objection to discharge in am on doxycycline 100mg PO Q12H x7 days and follow up with orhtopedics.    D/W Dr. Luis    D/W patient    Lazarus Hopkins MD  [phone]

## 2019-11-10 NOTE — PROGRESS NOTE ADULT - SUBJECTIVE AND OBJECTIVE BOX
[facility], Division of Infectious Diseases  JANE Melton A. Lee  [phone]    Name: СВЕТЛАНА DOWNEY  Age: 54y  Gender: Male  MRN: [id number]    Interval History--  Notes reviewed. Patient is felling ok. No fevers, chills, or rigors. Decreased pain. No AE related to the antibiotics.     Past Medical History--  Psoriasis  No pertinent past medical history      For details regarding the patient's social history, family history, and other miscellaneous elements, please refer the initial infectious diseases consultation and/or the admitting history and physical examination for this admission.    Allergies    No Known Allergies    Intolerances        Medications--  Antibiotics:  piperacillin/tazobactam IVPB.. 3.375 Gram(s) IV Intermittent every 8 hours  vancomycin  IVPB 1000 milliGRAM(s) IV Intermittent every 12 hours    Immunologic:  influenza   Vaccine 0.5 milliLiter(s) IntraMuscular once    Other:  acetaminophen   Tablet .. PRN      Review of Systems--  A 10-point review of systems was obtained.   Review of systems otherwise negative except as previously noted.    Physical Examination--  Vital Signs: T(F): 98 (11-10-19 @ 05:24), Max: 99 (11-09-19 @ 13:24)  HR: 68 (11-10-19 @ 05:24)  BP: 124/80 (11-10-19 @ 05:24)  RR: 17 (11-10-19 @ 05:24)  SpO2: 98% (11-10-19 @ 05:24)  Wt(kg): --  General: Nontoxic-appearing Male in no acute distress.  HEENT: AT/NC. Anicteric. Conjunctiva pink and moist. Oropharynx clear.   Neck: Not rigid. No sense of mass.  Nodes: None palpable.  Lungs: Clear bilaterally without rales, wheezing or rhonchi  Heart: Regular rate and rhythm. No Murmur. No rub. No gallop. No palpable thrill.  Abdomen: Bowel sounds present and normoactive. Soft. Nondistended. Nontender. No sense of mass. No organomegaly.  Extremities: RUE decreased edema and erythema. I&D site clean with red base. No drainage.No flucutuance. Minimal tenderness with palpation. Increased calor absent. ROM elbow full.   Skin: Warm. Dry. Good turgor. No rash. No vasculitic stigmata. Psoriaform plaques no change  Psychiatric: Appropriate affect and mood for situation.         Laboratory Studies--  CBC                        11.6   6.61  )-----------( 204      ( 10 Nov 2019 10:20 )             37.6       Chemistries  11-10    142  |  111<H>  |  13  ----------------------------<  80  4.1   |  25  |  1.10    Ca    9.0      10 Nov 2019 10:20    TPro  6.8  /  Alb  3.5  /  TBili  1.2  /  DBili  x   /  AST  19  /  ALT  29  /  AlkPhos  77  11-09    Culture Data  Culture - Blood (collected 09 Nov 2019 00:25)  Source: .Blood Blood-Peripheral  Preliminary Report (10 Nov 2019 01:04):    No growth to date.    Culture - Blood (collected 09 Nov 2019 00:25)  Source: .Blood Blood-Peripheral  Preliminary Report (10 Nov 2019 01:04):    No growth to date.

## 2019-11-11 VITALS
HEART RATE: 84 BPM | DIASTOLIC BLOOD PRESSURE: 82 MMHG | RESPIRATION RATE: 16 BRPM | SYSTOLIC BLOOD PRESSURE: 126 MMHG | TEMPERATURE: 98 F | OXYGEN SATURATION: 96 %

## 2019-11-11 LAB
ANION GAP SERPL CALC-SCNC: 6 MMOL/L — SIGNIFICANT CHANGE UP (ref 5–17)
BUN SERPL-MCNC: 14 MG/DL — SIGNIFICANT CHANGE UP (ref 7–23)
CALCIUM SERPL-MCNC: 9.4 MG/DL — SIGNIFICANT CHANGE UP (ref 8.5–10.1)
CHLORIDE SERPL-SCNC: 109 MMOL/L — HIGH (ref 96–108)
CO2 SERPL-SCNC: 27 MMOL/L — SIGNIFICANT CHANGE UP (ref 22–31)
CREAT SERPL-MCNC: 1.2 MG/DL — SIGNIFICANT CHANGE UP (ref 0.5–1.3)
GLUCOSE SERPL-MCNC: 86 MG/DL — SIGNIFICANT CHANGE UP (ref 70–99)
HCT VFR BLD CALC: 37.7 % — LOW (ref 39–50)
HGB BLD-MCNC: 11.4 G/DL — LOW (ref 13–17)
MCHC RBC-ENTMCNC: 19.5 PG — LOW (ref 27–34)
MCHC RBC-ENTMCNC: 30.2 GM/DL — LOW (ref 32–36)
MCV RBC AUTO: 64.6 FL — LOW (ref 80–100)
NRBC # BLD: 0 /100 WBCS — SIGNIFICANT CHANGE UP (ref 0–0)
PLATELET # BLD AUTO: 218 K/UL — SIGNIFICANT CHANGE UP (ref 150–400)
POTASSIUM SERPL-MCNC: 4.4 MMOL/L — SIGNIFICANT CHANGE UP (ref 3.5–5.3)
POTASSIUM SERPL-SCNC: 4.4 MMOL/L — SIGNIFICANT CHANGE UP (ref 3.5–5.3)
RBC # BLD: 5.84 M/UL — HIGH (ref 4.2–5.8)
RBC # FLD: 16.4 % — HIGH (ref 10.3–14.5)
SODIUM SERPL-SCNC: 142 MMOL/L — SIGNIFICANT CHANGE UP (ref 135–145)
WBC # BLD: 6.13 K/UL — SIGNIFICANT CHANGE UP (ref 3.8–10.5)
WBC # FLD AUTO: 6.13 K/UL — SIGNIFICANT CHANGE UP (ref 3.8–10.5)

## 2019-11-11 PROCEDURE — 85610 PROTHROMBIN TIME: CPT

## 2019-11-11 PROCEDURE — 80053 COMPREHEN METABOLIC PANEL: CPT

## 2019-11-11 PROCEDURE — 82962 GLUCOSE BLOOD TEST: CPT

## 2019-11-11 PROCEDURE — 99285 EMERGENCY DEPT VISIT HI MDM: CPT | Mod: 25

## 2019-11-11 PROCEDURE — 96365 THER/PROPH/DIAG IV INF INIT: CPT

## 2019-11-11 PROCEDURE — 85027 COMPLETE CBC AUTOMATED: CPT

## 2019-11-11 PROCEDURE — 99239 HOSP IP/OBS DSCHRG MGMT >30: CPT | Mod: GC

## 2019-11-11 PROCEDURE — 87641 MR-STAPH DNA AMP PROBE: CPT

## 2019-11-11 PROCEDURE — 71046 X-RAY EXAM CHEST 2 VIEWS: CPT

## 2019-11-11 PROCEDURE — 36415 COLL VENOUS BLD VENIPUNCTURE: CPT

## 2019-11-11 PROCEDURE — 87040 BLOOD CULTURE FOR BACTERIA: CPT

## 2019-11-11 PROCEDURE — 85652 RBC SED RATE AUTOMATED: CPT

## 2019-11-11 PROCEDURE — 93005 ELECTROCARDIOGRAM TRACING: CPT

## 2019-11-11 PROCEDURE — 80048 BASIC METABOLIC PNL TOTAL CA: CPT

## 2019-11-11 PROCEDURE — 86803 HEPATITIS C AB TEST: CPT

## 2019-11-11 PROCEDURE — 87640 STAPH A DNA AMP PROBE: CPT

## 2019-11-11 PROCEDURE — 73200 CT UPPER EXTREMITY W/O DYE: CPT

## 2019-11-11 PROCEDURE — 86140 C-REACTIVE PROTEIN: CPT

## 2019-11-11 PROCEDURE — 80202 ASSAY OF VANCOMYCIN: CPT

## 2019-11-11 PROCEDURE — 83605 ASSAY OF LACTIC ACID: CPT

## 2019-11-11 PROCEDURE — 85730 THROMBOPLASTIN TIME PARTIAL: CPT

## 2019-11-11 PROCEDURE — 73080 X-RAY EXAM OF ELBOW: CPT

## 2019-11-11 RX ORDER — LACTOBACILLUS ACIDOPHILUS 100MM CELL
1 CAPSULE ORAL
Qty: 14 | Refills: 0
Start: 2019-11-11 | End: 2019-11-17

## 2019-11-11 RX ADMIN — Medication 166.67 MILLIGRAM(S): at 11:15

## 2019-11-11 RX ADMIN — PIPERACILLIN AND TAZOBACTAM 25 GRAM(S): 4; .5 INJECTION, POWDER, LYOPHILIZED, FOR SOLUTION INTRAVENOUS at 06:05

## 2019-11-11 NOTE — DISCHARGE NOTE PROVIDER - NSDCMRMEDTOKEN_GEN_ALL_CORE_FT
doxycycline hyclate 100 mg oral capsule: 1 cap(s) orally every 12 hours   lactobacillus acidophilus oral capsule: 1 cap(s) orally 2 times a day

## 2019-11-11 NOTE — DISCHARGE NOTE PROVIDER - NSDCFUADDINST_GEN_ALL_CORE_FT
-Please follow up with your primary doctor within one week.  -Please follow up with ortho as outpatient (information below) within one week of discharge.  -Patient and family are responsible to set up all follow up appointments.  -Continue taking your medications as directed above.  -If symptoms persist/worsen, please call your PMD or return to the ED.

## 2019-11-11 NOTE — DISCHARGE NOTE PROVIDER - CARE PROVIDER_API CALL
Clarke Falk)  Orthopaedic Surgery; Surgery of the Hand  [address]  [place] [address]  Phone: [phone]  Fax: [phone]  Follow Up Time:     Micheal Null)  Internal Medicine  [address] [facility]   [address]  [place] [address]  Phone: [phone]  Fax: [phone]  Follow Up Time:

## 2019-11-11 NOTE — DISCHARGE NOTE NURSING/CASE MANAGEMENT/SOCIAL WORK - PATIENT PORTAL LINK FT
You can access the FollowMyHealth Patient Portal offered by [facility] by registering at the following website: [email or web address]. By joining [employer]’s FollowMyHealth portal, you will also be able to view your health information using other applications (apps) compatible with our system.

## 2019-11-11 NOTE — DISCHARGE NOTE PROVIDER - HOSPITAL COURSE
ADMISSION H+P:        HPI:    54 y male PMHx psoriasis presents with right upper extremity redness and swelling. Patient states that 14 years ago he fell and injured his right elbow, he developed bursitis a year later and had his elbow drained. He stated that he was fine up until 1 week ago when he banged his elbow injuring a psoriatic lesion that was present there. He then noticed some redness and swelling in his elbow which slowly worsened. He went to his dermatologist 4 days ago and was prescribed cefodroxil and sent to ortho. He was evaluated by Dr. Falk (ortho) yesterday and had an I&D done and a drain placed in his arm at the elbow. He was sent home on bactrim. Today the patient noted increase in redness and swelling traveling up his arm and down to his fingers. This was a fast progression of where his baseline erythema was present.  He also noted a t max of 101.7. Patient admitted to chills earlier this evening. Denies severe pain in RUE. Denies nausea, vomiting, abdominal pain.         In the ED    VS: T 99.7, , /82, RR 17, SpO2 100 on RA    Labs: Cr. 1.50, H/H 11.3/36.0    x ray right upper extremity: No radiographic evidence of acute osteomyelitis. Soft tissue swelling     with drain, bandage or packing material over the olecranon. (09 Nov 2019 00:33)            ---    HOSPITAL COURSE:         Patient was admitted for olecranon bursitis with superimposed psoriasis and cellulitis of the right elbow. Patient seen by ortho and infectious disease specialists during admission. Patient responded well to IV antibiotic treatment.             Patient was medically optimized and improved clinically throughout hospital course. Patient seen and examined on day of discharge.            Vital Signs    T(C): 36.9 (11 Nov 2019 05:54), Max: 36.9 (10 Nov 2019 13:21)    T(F): 98.5 (11 Nov 2019 05:54), Max: 98.5 (11 Nov 2019 05:54)    HR: 81 (11 Nov 2019 05:54) (81 - 89)    BP: 121/68 (11 Nov 2019 05:54) (121/68 - 124/79)    RR: 17 (11 Nov 2019 05:54) (17 - 17)    SpO2: 93% (11 Nov 2019 05:54) (93% - 97%)        Physical Exam:        GENERAL: NAD, well-groomed, well-developed    HEAD:  Atraumatic, Normocephalic    EYES: EOMI, PERRLA, conjunctiva and sclera clear    ENMT: No tonsillar erythema, exudates, or enlargement; Moist mucous membranes, Good dentition, No lesions    NECK: Supple, No JVD, Normal thyroid    NERVOUS SYSTEM:  Alert & Oriented X3, Good concentration; Motor Strength 5/5 B/L upper and lower extremities; DTRs 2+ intact and symmetric    CHEST/LUNG: Clear to auscultation bilaterally; No rales, rhonchi, wheezing, or rubs    HEART: Regular rate and rhythm; No murmurs, rubs, or gallops    ABDOMEN: Soft, Nontender, Nondistended; Bowel sounds present    EXTREMITIES:  2+ Peripheral Pulses, No clubbing, cyanosis, or edema, dressing over right elbow C/D/I    LYMPH: No lymphadenopathy noted    SKIN: decreased erythema surrounding right elbow, no purulence noted             Patient is medically stable for discharge to home with outpatient follow up.    ---    CONSULTANTS:     Ortho team     ID - Dr. Hopkins        ---    FINAL DISCHARGE DIAGNOSIS LIST:    Please see last daily progress note for final discharge diagnoses ADMISSION H+P:        HPI:    54 y male PMHx psoriasis presents with right upper extremity redness and swelling. Patient states that 14 years ago he fell and injured his right elbow, he developed bursitis a year later and had his elbow drained. He stated that he was fine up until 1 week ago when he banged his elbow injuring a psoriatic lesion that was present there. He then noticed some redness and swelling in his elbow which slowly worsened. He went to his dermatologist 4 days ago and was prescribed cefodroxil and sent to ortho. He was evaluated by Dr. Falk (ortho) yesterday and had an I&D done and a drain placed in his arm at the elbow. He was sent home on bactrim. Today the patient noted increase in redness and swelling traveling up his arm and down to his fingers. This was a fast progression of where his baseline erythema was present.  He also noted a t max of 101.7. Patient admitted to chills earlier this evening. Denies severe pain in RUE. Denies nausea, vomiting, abdominal pain.         In the ED    VS: T 99.7, , /82, RR 17, SpO2 100 on RA    Labs: Cr. 1.50, H/H 11.3/36.0    x ray right upper extremity: No radiographic evidence of acute osteomyelitis. Soft tissue swelling     with drain, bandage or packing material over the olecranon. (09 Nov 2019 00:33)            ---    HOSPITAL COURSE:         Patient was admitted for olecranon bursitis with superimposed psoriasis and cellulitis of the right elbow. Patient seen by ortho and infectious disease specialists during admission. Patient responded well to IV antibiotic treatment. Switched to po Doxycycline 100 mg Q12h for 7 days.             Patient was medically optimized and improved clinically throughout hospital course. Patient seen and examined on day of discharge.            Vital Signs    T(C): 36.9 (11 Nov 2019 05:54), Max: 36.9 (10 Nov 2019 13:21)    T(F): 98.5 (11 Nov 2019 05:54), Max: 98.5 (11 Nov 2019 05:54)    HR: 81 (11 Nov 2019 05:54) (81 - 89)    BP: 121/68 (11 Nov 2019 05:54) (121/68 - 124/79)    RR: 17 (11 Nov 2019 05:54) (17 - 17)    SpO2: 93% (11 Nov 2019 05:54) (93% - 97%)        Physical Exam:        GENERAL: NAD, well-groomed, well-developed    HEAD:  Atraumatic, Normocephalic    EYES: EOMI, PERRLA, conjunctiva and sclera clear    ENMT: No tonsillar erythema, exudates, or enlargement; Moist mucous membranes, Good dentition, No lesions    NECK: Supple, No JVD, Normal thyroid    NERVOUS SYSTEM:  Alert & Oriented X3, Good concentration; Motor Strength 5/5 B/L upper and lower extremities; DTRs 2+ intact and symmetric    CHEST/LUNG: Clear to auscultation bilaterally; No rales, rhonchi, wheezing, or rubs    HEART: Regular rate and rhythm; No murmurs, rubs, or gallops    ABDOMEN: Soft, Nontender, Nondistended; Bowel sounds present    EXTREMITIES:  2+ Peripheral Pulses, No clubbing, cyanosis, or edema, dressing over right elbow C/D/I    LYMPH: No lymphadenopathy noted    SKIN: decreased erythema surrounding right elbow, no purulence noted             Patient is medically stable for discharge to home with outpatient follow up.    ---    CONSULTANTS:     Ortho team     ID - Dr. Hopkins        ---    FINAL DISCHARGE DIAGNOSIS LIST:    Please see last daily progress note for final discharge diagnoses

## 2019-11-14 LAB
CULTURE RESULTS: SIGNIFICANT CHANGE UP
CULTURE RESULTS: SIGNIFICANT CHANGE UP
SPECIMEN SOURCE: SIGNIFICANT CHANGE UP
SPECIMEN SOURCE: SIGNIFICANT CHANGE UP

## 2021-05-20 NOTE — DISCHARGE NOTE PROVIDER - NSDCCPCAREPLAN_GEN_ALL_CORE_FT
Please advise PRINCIPAL DISCHARGE DIAGNOSIS  Diagnosis: Cellulitis and abscess of upper extremity  Assessment and Plan of Treatment: Improved  Patient is instructed to continue Doxycycline 100mg PO Q12 x 7 days  Patient is instructed to f/u with ortho within 1 week of discharge  Patient is instructed to f/u with ortho and PMD for all further managment  Patient is instructed to seek immediate medical attention if symptoms persist or worsen      SECONDARY DISCHARGE DIAGNOSES  Diagnosis: TALIA (acute kidney injury)  Assessment and Plan of Treatment: Improved  Patient is instructed to consume more fluids by mouth  Patient is instructed to f/u with ortho within 1 week of discharge  Patient is instructed to f/u with PMD for all further management  Patient is instructed to seek immediate medical attention if symptoms persist or worsen PRINCIPAL DISCHARGE DIAGNOSIS  Diagnosis: Cellulitis and abscess of upper extremity  Assessment and Plan of Treatment: Improved  Patient is instructed to complete course of Doxycycline 100mg PO Q12 x 7 days  Patient is instructed to complete course of probiotics Lactobacillus acidophilus PO BID x 7 days  Patient is instructed to f/u with ortho within 1 week of discharge  Patient is instructed to f/u with ortho and PMD for all further managment  Patient is instructed to seek immediate medical attention if symptoms persist or worsen      SECONDARY DISCHARGE DIAGNOSES  Diagnosis: TALIA (acute kidney injury)  Assessment and Plan of Treatment: Improved  Patient is instructed to consume more fluids by mouth  Patient is instructed to f/u with ortho within 1 week of discharge  Patient is instructed to f/u with PMD for all further management  Patient is instructed to seek immediate medical attention if symptoms persist or worsen PRINCIPAL DISCHARGE DIAGNOSIS  Diagnosis: Cellulitis and abscess of upper extremity  Assessment and Plan of Treatment: Improved  Patient is instructed to complete course of Doxycycline 100mg PO Q12 x 7 days  Patient is instructed to complete course of probiotics Lactobacillus acidophilus PO BID x 7 days  Change wound dressing each day with clean gauze.  Check your wound every day for signs of infection.   Watch for Redness, swelling, or pain, Fluid, blood, or pus, and if any develops, seek emergency medical care.  Keep the dressing dry until your health care provider says it can be removed.   Do not take baths, swim, use a hot tub, or do anything that would put your wound underwater until your health care provider approves.  Raise (elevate) the injured area above the level of your heart while you are sitting or lying down.  Follow up with ortho within 1 week of discharge.  Patient is instructed to f/u with ortho within 1 week of discharge  Patient is instructed to f/u with ortho and PMD for all further managment  Patient is instructed to seek immediate medical attention if symptoms persist or worsen      SECONDARY DISCHARGE DIAGNOSES  Diagnosis: TALIA (acute kidney injury)  Assessment and Plan of Treatment: Improved  Patient is instructed to consume more fluids by mouth  Patient is instructed to f/u with ortho within 1 week of discharge  Patient is instructed to f/u with PMD for all further management  Patient is instructed to seek immediate medical attention if symptoms persist or worsen